# Patient Record
Sex: FEMALE | Race: ASIAN | NOT HISPANIC OR LATINO | URBAN - METROPOLITAN AREA
[De-identification: names, ages, dates, MRNs, and addresses within clinical notes are randomized per-mention and may not be internally consistent; named-entity substitution may affect disease eponyms.]

---

## 2018-09-19 ENCOUNTER — OFFICE VISIT (OUTPATIENT)
Dept: URBAN - METROPOLITAN AREA CLINIC 193 | Facility: CLINIC | Age: 58
End: 2018-09-19
Payer: COMMERCIAL

## 2018-09-19 DIAGNOSIS — H52.13 MYOPIA, BILATERAL: ICD-10-CM

## 2018-09-19 PROCEDURE — 92012 INTRM OPH EXAM EST PATIENT: CPT | Performed by: OPTOMETRIST

## 2018-09-19 ASSESSMENT — KERATOMETRY
OD: 42.38
OS: 42.38

## 2018-09-19 ASSESSMENT — VISUAL ACUITY
OS: 20/25-
OD: 20/50+

## 2018-09-19 ASSESSMENT — INTRAOCULAR PRESSURE
OS: 14
OD: 14

## 2018-09-19 NOTE — IMPRESSION/PLAN
Impression: Age-related nuclear cataract, bilateral: H25.13 OU. OD worse. Affecting vision OD. Refractive status changing much OD but not OS. Plan: Cataracts account for the patient's complaints. Patient understands changing glasses will not significantly improve vision. Cataract surgery should improve vision. Patient willing to have surgery. Recommend cataract surgery OD. Premium lenses discussed. Consider STANDARD TORIC MULTIFOCAL lenses. DISTANCE refractive target. No reported health issues that should hinder surgery. Will likely need CE IOL OS also due to anisometropia. Spent much time discussing the option. Pt agrees to plan.

## 2018-09-19 NOTE — IMPRESSION/PLAN
Impression: Open angle with borderline findings, low risk, bilateral: H40.013. OU. Lg CDs. IOP OU still good today. Pachs: mildly thick OU. OCT 5/22/18: very normal OU. VF 5/22/18: non-specific superior defects OU. Plan: Discussed diagnosis in detail with patient. No treatment advised at this time. Continue to monitor.

## 2018-09-19 NOTE — IMPRESSION/PLAN
Impression: Myopia, bilateral: H52.13. Significant myopic progression OD only likely due to Lyman. Plan: Advise not getting new glasses/contacts at this time since nothing helps OD improve and will continue to worsen. Pt declined new CL/SRx at this time. Wait until after Cat surgery OD (and probably OS). Q$52 & $25 if wants SRx & CL Rx. May be able to get trials to help pt tide over to cataract surgery.  spent nearly 1 hr with pt.

## 2018-09-20 ENCOUNTER — OFFICE VISIT (OUTPATIENT)
Dept: URBAN - METROPOLITAN AREA CLINIC 193 | Facility: CLINIC | Age: 58
End: 2018-09-20
Payer: COMMERCIAL

## 2018-09-20 DIAGNOSIS — H52.4 PRESBYOPIA: ICD-10-CM

## 2018-09-20 PROCEDURE — 99215 OFFICE O/P EST HI 40 MIN: CPT | Performed by: OPHTHALMOLOGY

## 2018-09-20 RX ORDER — DUREZOL 0.5 MG/ML
0.05 % EMULSION OPHTHALMIC
Qty: 1 | Refills: 2 | Status: INACTIVE
Start: 2018-09-20 | End: 2018-12-19

## 2018-09-20 ASSESSMENT — INTRAOCULAR PRESSURE
OS: 13
OD: 14

## 2018-09-20 ASSESSMENT — VISUAL ACUITY
OS: 20/25-
OD: 20/50+

## 2018-09-20 NOTE — IMPRESSION/PLAN
Impression: Open angle with borderline findings, low risk, bilateral: H40.013. OU.
optic nerve appearance. IOP OU good today. VF and OCT reviewed Plan: Continue to monitor. being followed by Dr Abhishek Banegas. Discussed added risk.

## 2018-09-20 NOTE — IMPRESSION/PLAN
Impression: Age-related nuclear cataract, bilateral: H25.13. OU. Visually significant Plan: Cataracts account for the patient's complaints. Discussed all risks, benefits, procedures and recovery. Patient understands changing glasses will not improve vision. Discussed added risk due to astigmatism, ERM OS, high myopia. Patient desires to have surgery, recommend CE IOL OU, OD first.  Discussed iol options, recommend STANDARD (SA60WF) IOL. TARGET: DISTANCE  RL2 Discussed astigmatism diagnosis with patient. Patient understands that standard lens does not correct for astigmatism and patient will need gls for distance and near after Cataract Surgery. Patient understands. Discussed anisometropia after first cataract sx. Patient will need to out of CTL's 1 week prior to Ascan. Will need consult w/ Dr Billy West prior to cataract sx.

## 2018-09-20 NOTE — IMPRESSION/PLAN
Impression: Puckering of macula, left eye: H35.372. OS. Plan: Discussed diagnosis in detail with patient. Recommend retinal consultation with Dr. Tia Verde prior to cataract sx. Discussed added risk.

## 2018-09-28 ENCOUNTER — OFFICE VISIT (OUTPATIENT)
Dept: URBAN - METROPOLITAN AREA CLINIC 76 | Facility: CLINIC | Age: 58
End: 2018-09-28
Payer: COMMERCIAL

## 2018-09-28 DIAGNOSIS — H40.013 OPEN ANGLE WITH BORDERLINE FINDINGS, LOW RISK, BILATERAL: ICD-10-CM

## 2018-09-28 PROCEDURE — 92134 CPTRZ OPH DX IMG PST SGM RTA: CPT | Performed by: OPHTHALMOLOGY

## 2018-09-28 PROCEDURE — 99213 OFFICE O/P EST LOW 20 MIN: CPT | Performed by: OPHTHALMOLOGY

## 2018-09-28 ASSESSMENT — INTRAOCULAR PRESSURE
OD: 20
OS: 15

## 2018-09-28 NOTE — IMPRESSION/PLAN
Impression: Open angle with borderline findings, low risk, bilateral: H40.013. OU. Plan: Continue to monitor. being followed by Dr Vicki Moore. Discussed added risk due to myopic disc.

## 2018-09-28 NOTE — IMPRESSION/PLAN
Impression: Age-related nuclear cataract, bilateral: H25.13. Plan: No retinal contraindication to CE IOL OU.

## 2018-09-28 NOTE — IMPRESSION/PLAN
Impression: Puckering of macula, left eye: H35.372. W Lamellar hole Plan: OCT ordered and performed today. Discussed diagnosis with patient. The clinical exam was consistent with Epiretinal membrane. The diagnosis and natural history and prognosis of Epiretinal membrane, as well as the risks and benefits with Vitrectomy with membrane peeling were discussed. Given the current visual acuity  the patient elects to observe for now.

## 2018-12-19 ENCOUNTER — OFFICE VISIT (OUTPATIENT)
Dept: URBAN - METROPOLITAN AREA CLINIC 71 | Facility: CLINIC | Age: 58
End: 2018-12-19
Payer: COMMERCIAL

## 2018-12-19 DIAGNOSIS — H43.813 VITREOUS DEGENERATION, BILATERAL: ICD-10-CM

## 2018-12-19 DIAGNOSIS — H35.372 PUCKERING OF MACULA, LEFT EYE: ICD-10-CM

## 2018-12-19 PROCEDURE — 99215 OFFICE O/P EST HI 40 MIN: CPT | Performed by: OPHTHALMOLOGY

## 2018-12-19 RX ORDER — OFLOXACIN 3 MG/ML
0.3 % SOLUTION/ DROPS OPHTHALMIC
Qty: 1 | Refills: 1 | Status: INACTIVE
Start: 2018-12-19 | End: 2019-01-14

## 2018-12-19 RX ORDER — DUREZOL 0.5 MG/ML
0.05 % EMULSION OPHTHALMIC
Qty: 1 | Refills: 2 | Status: INACTIVE
Start: 2018-12-19 | End: 2019-01-14

## 2018-12-19 RX ORDER — OFLOXACIN 3 MG/ML
0.3 % SOLUTION/ DROPS OPHTHALMIC
Qty: 1 | Refills: 1 | Status: INACTIVE
Start: 2018-12-19 | End: 2018-12-19

## 2018-12-19 ASSESSMENT — INTRAOCULAR PRESSURE
OS: 17
OD: 12

## 2018-12-19 ASSESSMENT — KERATOMETRY
OD: 42.75
OS: 42.75

## 2018-12-19 ASSESSMENT — VISUAL ACUITY
OS: 20/25
OD: 20/60

## 2018-12-19 NOTE — IMPRESSION/PLAN
Impression: Age-related nuclear cataract, bilateral: H25.13. OU. Visually significant Plan: Cataracts account for the patient's complaints. Discussed all risks, benefits, procedures and recovery. Patient understands changing glasses will not improve vision. Discussed added risk due to astigmatism, ERM OS, high myopia. long discussion about multifocal and toric iol. do not recommend advance technology lens. Patient desires to have surgery, recommend CE IOL OU, OD first.  Discussed iol options, recommend STANDARD (SA60WF) IOL. TARGET: DISTANCE  RL2 Discussed astigmatism diagnosis with patient. Patient understands that standard lens does not correct for astigmatism and patient will need gls for distance and near after Cataract Surgery. Patient understands. Discussed anisometropia after first cataract sx. Advised patient it is not recommended to have Cataract sx 1-2 days with in each other. Patient will need to out of CTL's 1 week prior to Ascan.  
Patient cleared by Dr Virginia James for Cataract Sx.

## 2018-12-19 NOTE — IMPRESSION/PLAN
Impression: Puckering of macula, left eye: H35.372. W Lamellar hole
patient clear fro cataract sx per Dr Medrano Fail. Plan: Continue to monitor. Discussed added risk.

## 2018-12-19 NOTE — IMPRESSION/PLAN
Impression: Open angle with borderline findings, low risk, bilateral: H40.013. OU.
optic nerve appearance. IOP OU good today. Plan: Continue to monitor. being followed by Dr Daniel Britton. Discussed added risk.

## 2019-01-14 ENCOUNTER — PRE-OPERATIVE VISIT (OUTPATIENT)
Dept: URBAN - METROPOLITAN AREA CLINIC 71 | Facility: CLINIC | Age: 59
End: 2019-01-14
Payer: COMMERCIAL

## 2019-01-14 DIAGNOSIS — H25.13 AGE-RELATED NUCLEAR CATARACT, BILATERAL: Primary | ICD-10-CM

## 2019-01-14 PROCEDURE — 92136 OPHTHALMIC BIOMETRY: CPT | Performed by: OPHTHALMOLOGY

## 2019-01-14 RX ORDER — DUREZOL 0.5 MG/ML
0.05 % EMULSION OPHTHALMIC
Qty: 1 | Refills: 2 | Status: ACTIVE
Start: 2019-01-14

## 2019-01-14 RX ORDER — OFLOXACIN 3 MG/ML
0.3 % SOLUTION/ DROPS OPHTHALMIC
Qty: 1 | Refills: 1 | Status: ACTIVE
Start: 2019-01-14

## 2019-01-14 ASSESSMENT — PACHYMETRY
OS: 27.26
OD: 28.23

## 2019-01-30 ENCOUNTER — SURGERY (OUTPATIENT)
Dept: URBAN - METROPOLITAN AREA SURGERY 44 | Facility: SURGERY | Age: 59
End: 2019-01-30
Payer: COMMERCIAL

## 2019-01-30 PROCEDURE — 66984 XCAPSL CTRC RMVL W/O ECP: CPT | Performed by: OPHTHALMOLOGY

## 2019-01-31 ENCOUNTER — POST-OPERATIVE VISIT (OUTPATIENT)
Dept: URBAN - METROPOLITAN AREA CLINIC 71 | Facility: CLINIC | Age: 59
End: 2019-01-31

## 2019-01-31 PROCEDURE — 99024 POSTOP FOLLOW-UP VISIT: CPT | Performed by: OPTOMETRIST

## 2019-01-31 ASSESSMENT — INTRAOCULAR PRESSURE
OD: 15
OS: 11

## 2019-02-05 ENCOUNTER — POST-OPERATIVE VISIT (OUTPATIENT)
Dept: URBAN - METROPOLITAN AREA CLINIC 71 | Facility: CLINIC | Age: 59
End: 2019-02-05

## 2019-02-05 DIAGNOSIS — Z09 ENCNTR FOR F/U EXAM AFT TRTMT FOR COND OTH THAN MALIG NEOPLM: Primary | ICD-10-CM

## 2019-02-05 PROCEDURE — 99024 POSTOP FOLLOW-UP VISIT: CPT | Performed by: OPTOMETRIST

## 2019-02-05 ASSESSMENT — INTRAOCULAR PRESSURE
OS: 14
OD: 15

## 2019-02-25 ENCOUNTER — POST-OPERATIVE VISIT (OUTPATIENT)
Dept: URBAN - METROPOLITAN AREA CLINIC 71 | Facility: CLINIC | Age: 59
End: 2019-02-25

## 2019-02-25 PROCEDURE — 99024 POSTOP FOLLOW-UP VISIT: CPT | Performed by: OPTOMETRIST

## 2019-02-25 ASSESSMENT — INTRAOCULAR PRESSURE
OS: 17
OD: 16

## 2019-04-24 ENCOUNTER — Encounter (OUTPATIENT)
Dept: URBAN - METROPOLITAN AREA CLINIC 71 | Facility: CLINIC | Age: 59
End: 2019-04-24

## 2019-04-24 PROCEDURE — 99024 POSTOP FOLLOW-UP VISIT: CPT | Performed by: OPTOMETRIST
